# Patient Record
Sex: MALE | Race: WHITE | Employment: FULL TIME | URBAN - METROPOLITAN AREA
[De-identification: names, ages, dates, MRNs, and addresses within clinical notes are randomized per-mention and may not be internally consistent; named-entity substitution may affect disease eponyms.]

---

## 2021-09-10 ENCOUNTER — HOSPITAL ENCOUNTER (OUTPATIENT)
Dept: PREADMISSION TESTING | Age: 21
Discharge: HOME | End: 2021-09-10
Payer: COMMERCIAL

## 2021-09-10 LAB
ALBUMIN SERPL-MCNC: 4.5 G/DL (ref 3.4–5)
ALBUMIN/GLOB SERPL: 1.6 {RATIO} (ref 0.8–1.7)
ALP SERPL-CCNC: 85 U/L (ref 45–117)
ALT SERPL-CCNC: 103 U/L (ref 16–61)
ANION GAP SERPL CALC-SCNC: 7 MMOL/L (ref 3–18)
AST SERPL-CCNC: 35 U/L (ref 10–38)
BILIRUB SERPL-MCNC: 0.4 MG/DL (ref 0.2–1)
BUN SERPL-MCNC: 9 MG/DL (ref 7–18)
BUN/CREAT SERPL: 8 (ref 12–20)
CALCIUM SERPL-MCNC: 9.5 MG/DL (ref 8.5–10.1)
CHLORIDE SERPL-SCNC: 107 MMOL/L (ref 100–111)
CO2 SERPL-SCNC: 27 MMOL/L (ref 21–32)
CREAT SERPL-MCNC: 1.06 MG/DL (ref 0.6–1.3)
ERYTHROCYTE [DISTWIDTH] IN BLOOD BY AUTOMATED COUNT: 12.6 % (ref 11.6–14.5)
GLOBULIN SER CALC-MCNC: 2.8 G/DL (ref 2–4)
GLUCOSE SERPL-MCNC: 58 MG/DL (ref 74–99)
HCT VFR BLD AUTO: 43 % (ref 36–48)
HGB BLD-MCNC: 14.7 G/DL (ref 13–16)
MCH RBC QN AUTO: 29.3 PG (ref 24–34)
MCHC RBC AUTO-ENTMCNC: 34.2 G/DL (ref 31–37)
MCV RBC AUTO: 85.7 FL (ref 78–100)
PLATELET # BLD AUTO: 225 K/UL (ref 135–420)
PMV BLD AUTO: 10.3 FL (ref 9.2–11.8)
POTASSIUM SERPL-SCNC: 4.4 MMOL/L (ref 3.5–5.5)
PROT SERPL-MCNC: 7.3 G/DL (ref 6.4–8.2)
RBC # BLD AUTO: 5.02 M/UL (ref 4.35–5.65)
SODIUM SERPL-SCNC: 141 MMOL/L (ref 136–145)
WBC # BLD AUTO: 4.7 K/UL (ref 4.6–13.2)

## 2021-09-10 PROCEDURE — 36415 COLL VENOUS BLD VENIPUNCTURE: CPT

## 2021-09-10 PROCEDURE — 85027 COMPLETE CBC AUTOMATED: CPT

## 2021-09-10 PROCEDURE — 80053 COMPREHEN METABOLIC PANEL: CPT

## 2021-09-13 ENCOUNTER — HOSPITAL ENCOUNTER (OUTPATIENT)
Dept: PREADMISSION TESTING | Age: 21
Discharge: HOME | End: 2021-09-13

## 2021-09-13 VITALS — BODY MASS INDEX: 29.16 KG/M2 | WEIGHT: 220 LBS | HEIGHT: 73 IN

## 2021-09-13 RX ORDER — CHOLECALCIFEROL (VITAMIN D3) 125 MCG
CAPSULE ORAL DAILY
COMMUNITY

## 2021-09-13 RX ORDER — SODIUM CHLORIDE, SODIUM LACTATE, POTASSIUM CHLORIDE, CALCIUM CHLORIDE 600; 310; 30; 20 MG/100ML; MG/100ML; MG/100ML; MG/100ML
125 INJECTION, SOLUTION INTRAVENOUS CONTINUOUS
Status: CANCELLED | OUTPATIENT
Start: 2021-09-13

## 2021-09-13 RX ORDER — CETIRIZINE HYDROCHLORIDE 10 MG/1
CAPSULE, LIQUID FILLED ORAL DAILY
COMMUNITY

## 2021-09-13 NOTE — PERIOP NOTES
Patient educated on NPO, CHG, and current COVID 19 protocols. Patient states he is fully vaccinated. Patient states he will either fax in or bring vaccination record to [facility]. Fax number provided. Medications reviewed. Patient educated on current visitation policies. Patient advised to leave valuables at home or with a loved one. All questions answered by RN. Preoperative Nutrition Screen (REYNA)   Patient's Age: 19 y.o. Patient's BMI: Estimated body mass index is 29.03 kg/m² as calculated from the following:    Height as of this encounter: 6' 1\" (1.854 m). Weight as of this encounter: 99.8 kg (220 lb). 1. Does the patient have a documented serum albumin less than 3.0 within the last 90 days? No = 0          2. Is patient's BMI less than 18.5 (or less than 20 if age over 72)? No = 0        3. Has the patient had an unplanned weight loss of 10% of body weight or more in the last 6 months? No = 0   4. Has the patient been eating less than 50% of their normal diet in the preceding week?  No = 0   REYNA Score (number of Yes responses), 0-4 0       Electronically signed by Burton Clemens on 9/13/21 at 3:52 PM

## 2021-09-24 ENCOUNTER — HOSPITAL ENCOUNTER (OUTPATIENT)
Dept: PREADMISSION TESTING | Age: 21
Discharge: HOME | End: 2021-09-24
Payer: COMMERCIAL

## 2021-09-24 PROBLEM — S83.511A RUPTURE OF ANTERIOR CRUCIATE LIGAMENT OF RIGHT KNEE: Chronic | Status: ACTIVE | Noted: 2021-09-24

## 2021-09-24 PROBLEM — J45.909 ASTHMA: Chronic | Status: ACTIVE | Noted: 2021-09-24

## 2021-09-24 PROCEDURE — U0003 INFECTIOUS AGENT DETECTION BY NUCLEIC ACID (DNA OR RNA); SEVERE ACUTE RESPIRATORY SYNDROME CORONAVIRUS 2 (SARS-COV-2) (CORONAVIRUS DISEASE [COVID-19]), AMPLIFIED PROBE TECHNIQUE, MAKING USE OF HIGH THROUGHPUT TECHNOLOGIES AS DESCRIBED BY CMS-2020-01-R: HCPCS

## 2021-09-24 NOTE — H&P
History and Physical        Patient: Gerhardt Hidden               Sex: male          DOA: (Not on file)         YOB: 2000      Age:  19 y.o.        LOS:  LOS: 0 days        HPI:     Chrystal Lozada is in for followup of his right knee complete ACL tear/lateral meniscal tear as seen by MRI from his injury [place] [employer] two weeks ago. Dr. Tea Paulson did his left knee ACL reconstruction in 2014 and it is still doing well. The patient injured his right knee when he was down at [facility] in [place]. He was just out cleaning his bike and was just twisting and pivoting on it and felt onset of acute pain in the right knee with swelling. He has had discomfort since that time. It is mainly posteromedial, and it has not done better with relative rest. His left knee has done great since the reconstruction performed seven years ago. Standing AP, tunnel, lateral, and sunrise views of the right knee were obtained and interpreted in the office and reveal no periosteal reaction, no medullary lesions, no osteopenia, well-aligned joint spaces, and no chondrolysis. Past Medical History:   Diagnosis Date    Asthma     no inhaler in 10+ years       Past Surgical History:   Procedure Laterality Date    HX ORTHOPAEDIC Left 2015    ACL repair       No family history on file. Social History     Socioeconomic History    Marital status: SINGLE     Spouse name: Not on file    Number of children: Not on file    Years of education: Not on file    Highest education level: Not on file   Tobacco Use    Smoking status: Never Smoker    Smokeless tobacco: Never Used   Vaping Use    Vaping Use: Never used   Substance and Sexual Activity    Alcohol use:  Yes     Alcohol/week: 1.0 standard drinks     Types: 1 Cans of beer per week    Drug use: No     Social Determinants of Health     Financial Resource Strain:     Difficulty of Paying Living Expenses:    Food Insecurity:     Worried About Running Out of Food in the Last Year:     Ran Out of Food in the Last Year:    Transportation Needs:     Lack of Transportation (Medical):  Lack of Transportation (Non-Medical):    Physical Activity:     Days of Exercise per Week:     Minutes of Exercise per Session:    Stress:     Feeling of Stress :    Social Connections:     Frequency of Communication with Friends and Family:     Frequency of Social Gatherings with Friends and Family:     Attends Rastafari Services:     Active Member of Clubs or Organizations:     Attends Club or Organization Meetings:     Marital Status:        Prior to Admission medications    Medication Sig Start Date End Date Taking? Authorizing Provider   Cetirizine (ZyrTEC) 10 mg cap Take  by mouth daily. Provider, Historical   naproxen sodium (Aleve) 220 mg cap Take  by mouth daily. Provider, Historical       No Known Allergies    Review of Systems  A comprehensive review of systems was negative except for that written in the History of Present Illness. Physical Exam:      There were no vitals taken for this visit. Physical Exam:  Physical exam shows a healthy-appearing 28-year-old white male. The right knee has +1 Lachman but a good endpoint. This is identical to his left knee. He has pain on the posteromedial joint line. He has mild pain at the lateral joint line and pain with attempted terminal extension and will not extend beyond 5 degrees with his leg sort of locked. His knee has about a 10cc effusion. Assessment/Plan     Principal Problem:    Rupture of anterior cruciate ligament of right knee (9/24/2021)    Active Problems:    Asthma (9/24/2021)    Dr. Jair Colbert scheduled him for a right knee ACL reconstruction with bone-patellar tendon-bone allograft. We will plan for a lateral meniscal repair and evaluate his medial meniscus because he tore both of these at the time we did his left knee ACL reconstruction.  Dr. Jair Colbert talked about the risks, alternatives, and benefits including infection, pain and bleeding. He understands all of these and wants to proceed. Dr. Lara Salas issued him Keflex along with Roxicodone. He has crutches that he has that he can use. We will give him a knee immobilizer at the time. He understands all these risks and wants to proceed. 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 Dr. Lara Salas will see him again 3-5 days postop. We will start him in rehab then. He understands all of these risks and is willing to proceed.   Dr. Lara Salas will do this at [facility].

## 2021-09-25 LAB — SARS-COV-2, COV2NT: NOT DETECTED

## 2021-09-28 ENCOUNTER — ANESTHESIA EVENT (OUTPATIENT)
Dept: SURGERY | Age: 21
End: 2021-09-28
Payer: COMMERCIAL

## 2021-09-28 RX ORDER — SODIUM CHLORIDE 0.9 % (FLUSH) 0.9 %
5-40 SYRINGE (ML) INJECTION EVERY 8 HOURS
Status: CANCELLED | OUTPATIENT
Start: 2021-09-28

## 2021-09-28 RX ORDER — SODIUM CHLORIDE 0.9 % (FLUSH) 0.9 %
5-40 SYRINGE (ML) INJECTION AS NEEDED
Status: CANCELLED | OUTPATIENT
Start: 2021-09-28

## 2021-09-29 ENCOUNTER — HOSPITAL ENCOUNTER (OUTPATIENT)
Age: 21
Discharge: HOME | End: 2021-09-29
Attending: ORTHOPAEDIC SURGERY | Admitting: ORTHOPAEDIC SURGERY
Payer: COMMERCIAL

## 2021-09-29 ENCOUNTER — ANESTHESIA (OUTPATIENT)
Dept: SURGERY | Age: 21
End: 2021-09-29
Payer: COMMERCIAL

## 2021-09-29 VITALS
OXYGEN SATURATION: 97 % | RESPIRATION RATE: 16 BRPM | DIASTOLIC BLOOD PRESSURE: 78 MMHG | SYSTOLIC BLOOD PRESSURE: 143 MMHG | HEART RATE: 48 BPM | WEIGHT: 194.9 LBS | BODY MASS INDEX: 25.83 KG/M2 | HEIGHT: 73 IN | TEMPERATURE: 97.1 F

## 2021-09-29 DIAGNOSIS — S83.511A RUPTURE OF ANTERIOR CRUCIATE LIGAMENT OF RIGHT KNEE, INITIAL ENCOUNTER: Primary | ICD-10-CM

## 2021-09-29 PROCEDURE — 2709999900 HC NON-CHARGEABLE SUPPLY: Performed by: ORTHOPAEDIC SURGERY

## 2021-09-29 PROCEDURE — 77030002922 HC SUT FBRWRE ARTH -B: Performed by: ORTHOPAEDIC SURGERY

## 2021-09-29 PROCEDURE — 76210000021 HC REC RM PH II 0.5 TO 1 HR: Performed by: ORTHOPAEDIC SURGERY

## 2021-09-29 PROCEDURE — 76060000033 HC ANESTHESIA 1 TO 1.5 HR: Performed by: ORTHOPAEDIC SURGERY

## 2021-09-29 PROCEDURE — 76210000006 HC OR PH I REC 0.5 TO 1 HR: Performed by: ORTHOPAEDIC SURGERY

## 2021-09-29 PROCEDURE — 77030028770 HC NDL FSTFIX SYS S&N -F: Performed by: ORTHOPAEDIC SURGERY

## 2021-09-29 PROCEDURE — 77030012508 HC MSK AIRWY LMA AMBU -A: Performed by: NURSE ANESTHETIST, CERTIFIED REGISTERED

## 2021-09-29 PROCEDURE — 77030034478 HC TU IRR ARTHRO PT ARTH -B: Performed by: ORTHOPAEDIC SURGERY

## 2021-09-29 PROCEDURE — 77030028773 HC KT ACL RAP-PAC DISP S&N -C: Performed by: ORTHOPAEDIC SURGERY

## 2021-09-29 PROCEDURE — 77030002933 HC SUT MCRYL J&J -A: Performed by: ORTHOPAEDIC SURGERY

## 2021-09-29 PROCEDURE — 77030012455 HC GD PIN ORTH S&N -C: Performed by: ORTHOPAEDIC SURGERY

## 2021-09-29 PROCEDURE — 74011250637 HC RX REV CODE- 250/637: Performed by: ANESTHESIOLOGY

## 2021-09-29 PROCEDURE — 77030040361 HC SLV COMPR DVT MDII -B: Performed by: ORTHOPAEDIC SURGERY

## 2021-09-29 PROCEDURE — 77030020782 HC GWN BAIR PAWS FLX 3M -B: Performed by: ORTHOPAEDIC SURGERY

## 2021-09-29 PROCEDURE — 77030028772 HC KNOT PSHR SUT CUT CANN S&N -C: Performed by: ORTHOPAEDIC SURGERY

## 2021-09-29 PROCEDURE — 74011250636 HC RX REV CODE- 250/636: Performed by: ORTHOPAEDIC SURGERY

## 2021-09-29 PROCEDURE — 77030006788 HC BLD SAW OSC STRY -B: Performed by: ORTHOPAEDIC SURGERY

## 2021-09-29 PROCEDURE — 74011250636 HC RX REV CODE- 250/636: Performed by: PHYSICIAN ASSISTANT

## 2021-09-29 PROCEDURE — 77030022877 HC TU IRR ARTHRO PMP ARTH -B: Performed by: ORTHOPAEDIC SURGERY

## 2021-09-29 PROCEDURE — 74011250636 HC RX REV CODE- 250/636: Performed by: NURSE ANESTHETIST, CERTIFIED REGISTERED

## 2021-09-29 PROCEDURE — C1762 CONN TISS, HUMAN(INC FASCIA): HCPCS | Performed by: ORTHOPAEDIC SURGERY

## 2021-09-29 PROCEDURE — 77030006884 HC BLD SHV INCIS S&N -B: Performed by: ORTHOPAEDIC SURGERY

## 2021-09-29 PROCEDURE — 76010000149 HC OR TIME 1 TO 1.5 HR: Performed by: ORTHOPAEDIC SURGERY

## 2021-09-29 PROCEDURE — L1830 KO IMMOB CANVAS LONG PRE OTS: HCPCS | Performed by: ORTHOPAEDIC SURGERY

## 2021-09-29 PROCEDURE — 74011000250 HC RX REV CODE- 250: Performed by: ORTHOPAEDIC SURGERY

## 2021-09-29 PROCEDURE — 77030031140 HC SUT VCRL3 J&J -A: Performed by: ORTHOPAEDIC SURGERY

## 2021-09-29 PROCEDURE — 77030004451 HC BUR SHV S&N -B: Performed by: ORTHOPAEDIC SURGERY

## 2021-09-29 PROCEDURE — 74011000250 HC RX REV CODE- 250: Performed by: NURSE ANESTHETIST, CERTIFIED REGISTERED

## 2021-09-29 PROCEDURE — 77030018835 HC SOL IRR LR ICUM -A: Performed by: ORTHOPAEDIC SURGERY

## 2021-09-29 PROCEDURE — 74011250636 HC RX REV CODE- 250/636: Performed by: ANESTHESIOLOGY

## 2021-09-29 DEVICE — FAST-FIX 360 CURVED MENISCAL                                    REPAIR SYSTEM
Type: IMPLANTABLE DEVICE | Site: KNEE | Status: FUNCTIONAL
Brand: FAST-FIX

## 2021-09-29 DEVICE — 9 MM X 25 MM BIOSURE HA
Type: IMPLANTABLE DEVICE | Site: KNEE | Status: FUNCTIONAL
Brand: BIOSURE

## 2021-09-29 DEVICE — 7 MM X 20 MM BIOSURE HA
Type: IMPLANTABLE DEVICE | Site: KNEE | Status: FUNCTIONAL
Brand: BIOSURE

## 2021-09-29 DEVICE — GRAFT HUM TISS L12MM HEMIPATELLAR TEND ALLGRFT: Type: IMPLANTABLE DEVICE | Site: KNEE | Status: FUNCTIONAL

## 2021-09-29 RX ORDER — SODIUM CHLORIDE, SODIUM LACTATE, POTASSIUM CHLORIDE, CALCIUM CHLORIDE 600; 310; 30; 20 MG/100ML; MG/100ML; MG/100ML; MG/100ML
1000 INJECTION, SOLUTION INTRAVENOUS CONTINUOUS
Status: DISCONTINUED | OUTPATIENT
Start: 2021-09-29 | End: 2021-09-29 | Stop reason: HOSPADM

## 2021-09-29 RX ORDER — BUPIVACAINE HYDROCHLORIDE AND EPINEPHRINE 5; 5 MG/ML; UG/ML
INJECTION, SOLUTION EPIDURAL; INTRACAUDAL; PERINEURAL AS NEEDED
Status: DISCONTINUED | OUTPATIENT
Start: 2021-09-29 | End: 2021-09-29 | Stop reason: HOSPADM

## 2021-09-29 RX ORDER — KETAMINE HYDROCHLORIDE 10 MG/ML
INJECTION, SOLUTION INTRAMUSCULAR; INTRAVENOUS AS NEEDED
Status: DISCONTINUED | OUTPATIENT
Start: 2021-09-29 | End: 2021-09-29 | Stop reason: HOSPADM

## 2021-09-29 RX ORDER — SODIUM CHLORIDE 0.9 % (FLUSH) 0.9 %
5-40 SYRINGE (ML) INJECTION EVERY 8 HOURS
Status: DISCONTINUED | OUTPATIENT
Start: 2021-09-29 | End: 2021-09-29 | Stop reason: HOSPADM

## 2021-09-29 RX ORDER — FENTANYL CITRATE 50 UG/ML
25 INJECTION, SOLUTION INTRAMUSCULAR; INTRAVENOUS AS NEEDED
Status: DISCONTINUED | OUTPATIENT
Start: 2021-09-29 | End: 2021-09-29 | Stop reason: HOSPADM

## 2021-09-29 RX ORDER — OXYCODONE HYDROCHLORIDE 5 MG/1
5-10 TABLET ORAL
Qty: 30 TABLET | Refills: 0 | Status: SHIPPED | OUTPATIENT
Start: 2021-09-29 | End: 2021-10-06

## 2021-09-29 RX ORDER — HYDROMORPHONE HYDROCHLORIDE 1 MG/ML
0.5 INJECTION, SOLUTION INTRAMUSCULAR; INTRAVENOUS; SUBCUTANEOUS
Status: DISCONTINUED | OUTPATIENT
Start: 2021-09-29 | End: 2021-09-29 | Stop reason: HOSPADM

## 2021-09-29 RX ORDER — MAGNESIUM SULFATE 100 %
4 CRYSTALS MISCELLANEOUS AS NEEDED
Status: DISCONTINUED | OUTPATIENT
Start: 2021-09-29 | End: 2021-09-29 | Stop reason: HOSPADM

## 2021-09-29 RX ORDER — SODIUM CHLORIDE, SODIUM LACTATE, POTASSIUM CHLORIDE, CALCIUM CHLORIDE 600; 310; 30; 20 MG/100ML; MG/100ML; MG/100ML; MG/100ML
125 INJECTION, SOLUTION INTRAVENOUS CONTINUOUS
Status: DISCONTINUED | OUTPATIENT
Start: 2021-09-29 | End: 2021-09-29 | Stop reason: HOSPADM

## 2021-09-29 RX ORDER — ONDANSETRON 2 MG/ML
INJECTION INTRAMUSCULAR; INTRAVENOUS AS NEEDED
Status: DISCONTINUED | OUTPATIENT
Start: 2021-09-29 | End: 2021-09-29 | Stop reason: HOSPADM

## 2021-09-29 RX ORDER — NALOXONE HYDROCHLORIDE 4 MG/.1ML
SPRAY NASAL
Qty: 1 EACH | Refills: 0 | Status: SHIPPED | OUTPATIENT
Start: 2021-09-29

## 2021-09-29 RX ORDER — MIDAZOLAM HYDROCHLORIDE 1 MG/ML
INJECTION, SOLUTION INTRAMUSCULAR; INTRAVENOUS AS NEEDED
Status: DISCONTINUED | OUTPATIENT
Start: 2021-09-29 | End: 2021-09-29 | Stop reason: HOSPADM

## 2021-09-29 RX ORDER — HYDROMORPHONE HYDROCHLORIDE 2 MG/ML
INJECTION, SOLUTION INTRAMUSCULAR; INTRAVENOUS; SUBCUTANEOUS AS NEEDED
Status: DISCONTINUED | OUTPATIENT
Start: 2021-09-29 | End: 2021-09-29 | Stop reason: HOSPADM

## 2021-09-29 RX ORDER — CEFAZOLIN SODIUM/WATER 2 G/20 ML
2 SYRINGE (ML) INTRAVENOUS ONCE
Status: COMPLETED | OUTPATIENT
Start: 2021-09-29 | End: 2021-09-29

## 2021-09-29 RX ORDER — NALOXONE HYDROCHLORIDE 0.4 MG/ML
0.1 INJECTION, SOLUTION INTRAMUSCULAR; INTRAVENOUS; SUBCUTANEOUS AS NEEDED
Status: DISCONTINUED | OUTPATIENT
Start: 2021-09-29 | End: 2021-09-29 | Stop reason: HOSPADM

## 2021-09-29 RX ORDER — DEXTROSE 50 % IN WATER (D50W) INTRAVENOUS SYRINGE
25-50 AS NEEDED
Status: DISCONTINUED | OUTPATIENT
Start: 2021-09-29 | End: 2021-09-29 | Stop reason: HOSPADM

## 2021-09-29 RX ORDER — SODIUM CHLORIDE 0.9 % (FLUSH) 0.9 %
5-40 SYRINGE (ML) INJECTION AS NEEDED
Status: DISCONTINUED | OUTPATIENT
Start: 2021-09-29 | End: 2021-09-29 | Stop reason: HOSPADM

## 2021-09-29 RX ORDER — OXYCODONE HYDROCHLORIDE 5 MG/1
5 TABLET ORAL ONCE
Status: COMPLETED | OUTPATIENT
Start: 2021-09-29 | End: 2021-09-29

## 2021-09-29 RX ORDER — CEPHALEXIN 500 MG/1
500 CAPSULE ORAL 4 TIMES DAILY
Qty: 4 CAPSULE | Refills: 0 | Status: SHIPPED | OUTPATIENT
Start: 2021-09-29 | End: 2021-09-30

## 2021-09-29 RX ORDER — PROPOFOL 10 MG/ML
INJECTION, EMULSION INTRAVENOUS AS NEEDED
Status: DISCONTINUED | OUTPATIENT
Start: 2021-09-29 | End: 2021-09-29 | Stop reason: HOSPADM

## 2021-09-29 RX ORDER — DEXAMETHASONE SODIUM PHOSPHATE 4 MG/ML
INJECTION, SOLUTION INTRA-ARTICULAR; INTRALESIONAL; INTRAMUSCULAR; INTRAVENOUS; SOFT TISSUE AS NEEDED
Status: DISCONTINUED | OUTPATIENT
Start: 2021-09-29 | End: 2021-09-29 | Stop reason: HOSPADM

## 2021-09-29 RX ORDER — CEPHALEXIN 500 MG/1
500 CAPSULE ORAL 4 TIMES DAILY
Qty: 4 CAPSULE | Refills: 0 | Status: SHIPPED
Start: 2021-09-29 | End: 2021-09-29 | Stop reason: SDUPTHER

## 2021-09-29 RX ORDER — LIDOCAINE HYDROCHLORIDE 20 MG/ML
INJECTION, SOLUTION EPIDURAL; INFILTRATION; INTRACAUDAL; PERINEURAL AS NEEDED
Status: DISCONTINUED | OUTPATIENT
Start: 2021-09-29 | End: 2021-09-29 | Stop reason: HOSPADM

## 2021-09-29 RX ORDER — FLUMAZENIL 0.1 MG/ML
0.2 INJECTION INTRAVENOUS
Status: DISCONTINUED | OUTPATIENT
Start: 2021-09-29 | End: 2021-09-29 | Stop reason: HOSPADM

## 2021-09-29 RX ADMIN — OXYCODONE 5 MG: 5 TABLET ORAL at 10:01

## 2021-09-29 RX ADMIN — LIDOCAINE HYDROCHLORIDE 100 MG: 20 INJECTION, SOLUTION INTRAVENOUS at 08:00

## 2021-09-29 RX ADMIN — FENTANYL CITRATE 25 MCG: 50 INJECTION INTRAMUSCULAR; INTRAVENOUS at 09:28

## 2021-09-29 RX ADMIN — HYDROMORPHONE HYDROCHLORIDE 1 MG: 2 INJECTION, SOLUTION INTRAMUSCULAR; INTRAVENOUS; SUBCUTANEOUS at 08:07

## 2021-09-29 RX ADMIN — SODIUM CHLORIDE, SODIUM LACTATE, POTASSIUM CHLORIDE, AND CALCIUM CHLORIDE 125 ML/HR: 600; 310; 30; 20 INJECTION, SOLUTION INTRAVENOUS at 06:19

## 2021-09-29 RX ADMIN — FENTANYL CITRATE 25 MCG: 50 INJECTION INTRAMUSCULAR; INTRAVENOUS at 09:18

## 2021-09-29 RX ADMIN — KETAMINE HYDROCHLORIDE 20 MG: 10 INJECTION, SOLUTION INTRAMUSCULAR; INTRAVENOUS at 07:54

## 2021-09-29 RX ADMIN — KETAMINE HYDROCHLORIDE 30 MG: 10 INJECTION, SOLUTION INTRAMUSCULAR; INTRAVENOUS at 08:05

## 2021-09-29 RX ADMIN — CEFAZOLIN 2 G: 1 INJECTION, POWDER, FOR SOLUTION INTRAVENOUS at 08:03

## 2021-09-29 RX ADMIN — PROPOFOL 200 MG: 10 INJECTION, EMULSION INTRAVENOUS at 08:00

## 2021-09-29 RX ADMIN — MIDAZOLAM 2 MG: 1 INJECTION INTRAMUSCULAR; INTRAVENOUS at 07:54

## 2021-09-29 RX ADMIN — DEXAMETHASONE SODIUM PHOSPHATE 4 MG: 4 INJECTION, SOLUTION INTRAMUSCULAR; INTRAVENOUS at 08:09

## 2021-09-29 RX ADMIN — ONDANSETRON HYDROCHLORIDE 4 MG: 2 INJECTION INTRAMUSCULAR; INTRAVENOUS at 08:09

## 2021-09-29 NOTE — ANESTHESIA POSTPROCEDURE EVALUATION
Procedure(s):  SURGICAL ARTHROSCOPY RIGHT KNEE ANTERIOR CRUCIATE LIGAMENT RECONSTRUCTION LATERAL MENISCAL REPAIR [facility]). general    Anesthesia Post Evaluation        Comments: Post-Anesthesia Evaluation and Assessment    Cardiovascular Function/Vital Signs  /84   Pulse 69   Temp 36.2 °C (97.1 °F)   Resp 12   Ht 6' 1\" (1.854 m)   Wt 88.4 kg (194 lb 14.4 oz)   SpO2 98%   BMI 25.71 kg/m²     Patient is status post Procedure(s):  SURGICAL ARTHROSCOPY RIGHT KNEE ANTERIOR CRUCIATE LIGAMENT RECONSTRUCTION LATERAL MENISCAL REPAIR ([employer]). Nausea/Vomiting: Controlled. Postoperative hydration reviewed and adequate. Pain:  Pain Scale 1: FLACC (09/29/21 0942)  Pain Intensity 1: 0 (09/29/21 0942)   Managed. Neurological Status:   Neuro (WDL): Within Defined Limits (09/29/21 0930)   At baseline. Mental Status and Level of Consciousness: Arousable. Pulmonary Status:   O2 Device: None (Room air) (09/29/21 0930)   Adequate oxygenation and airway patent. Complications related to anesthesia: None    Post-anesthesia assessment completed. No concerns. Patient has met all discharge requirements. Signed By: Atul Pennington MD    September 29, 2021                   INITIAL Post-op Vital signs:   Vitals Value Taken Time   /84 09/29/21 0935   Temp 36.2 °C (97.1 °F) 09/29/21 0908   Pulse 55 09/29/21 0941   Resp 10 09/29/21 0941   SpO2 98 % 09/29/21 0941   Vitals shown include unvalidated device data.

## 2021-09-29 NOTE — ANESTHESIA PREPROCEDURE EVALUATION
Relevant Problems   RESPIRATORY SYSTEM   (+) Asthma       Anesthetic History   No history of anesthetic complications            Review of Systems / Medical History  Patient summary reviewed, nursing notes reviewed and pertinent labs reviewed    Pulmonary            Asthma        Neuro/Psych   Within defined limits           Cardiovascular  Within defined limits                     GI/Hepatic/Renal  Within defined limits              Endo/Other  Within defined limits      Anemia     Other Findings            Physical Exam    Airway  Mallampati: I  TM Distance: 4 - 6 cm  Neck ROM: normal range of motion   Mouth opening: Normal     Cardiovascular    Rhythm: regular  Rate: normal         Dental  No notable dental hx       Pulmonary  Breath sounds clear to auscultation               Abdominal  GI exam deferred       Other Findings            Anesthetic Plan    ASA: 1  Anesthesia type: general          Induction: Intravenous  Anesthetic plan and risks discussed with: Patient

## 2021-09-29 NOTE — DISCHARGE INSTRUCTIONS
DISCHARGE SUMMARY from Nurse    PATIENT INSTRUCTIONS:    After general anesthesia or intravenous sedation, for 24 hours or while taking prescription Narcotics:  · Limit your activities  · Do not drive and operate hazardous machinery  · Do not make important personal or business decisions  · Do  not drink alcoholic beverages  · If you have not urinated within 8 hours after discharge, please contact your surgeon on call. Report the following to your surgeon:  · Excessive pain, swelling, redness or odor of or around the surgical area  · Temperature over 100.5  · Nausea and vomiting lasting longer than 4 hours or if unable to take medications  · Any signs of decreased circulation or nerve impairment to extremity: change in color, persistent  numbness, tingling, coldness or increase pain  · Any questions    What to do at Home:  Lashanda Cardenas 3 TO 5 DAYS CALL FOR APPT    If you experience any of the following symptoms heavy bleeding, fevers,severe pain, circulation changes, please follow up with dr Ro Russell    *  Please give a list of your current medications to your Primary Care Provider. *  Please update this list whenever your medications are discontinued, doses are      changed, or new medications (including over-the-counter products) are added. *  Please carry medication information at all times in case of emergency situations. These are general instructions for a healthy lifestyle:    No smoking/ No tobacco products/ Avoid exposure to second hand smoke  Surgeon General's Warning:  Quitting smoking now greatly reduces serious risk to your health.     Obesity, smoking, and sedentary lifestyle greatly increases your risk for illness    A healthy diet, regular physical exercise & weight monitoring are important for maintaining a healthy lifestyle    You may be retaining fluid if you have a history of heart failure or if you experience any of the following symptoms:  Weight gain of 3 pounds or more overnight or 5 pounds in a week, increased swelling in our hands or feet or shortness of breath while lying flat in bed. Please call your doctor as soon as you notice any of these symptoms; do not wait until your next office visit. The discharge information has been reviewed with the patient and caregiver. The patient and caregiver verbalized understanding. Discharge medications reviewed with the patient and caregiver and appropriate educational materials and side effects teaching were provided. ___________________________________________________________________________________________________________________________________         ACL Surgery Discharge Instructions         Please take the time to review the following instructions before you leave the hospital and use them as guidelines during your recovery from surgery. If you have any questions you may contact my office at [phone]. Wound Care / Dressing Changes:     Please remove your big, bulky dressing 2 days after you are discharged from the hospital.  [place] Ahsan can put a band-aid or a piece of gauze over each incision and wear an ACE bandage as needed for comfort and swelling. It isn't necessary to apply antibiotic ointment to your incisions. Susy Hallmark / Bathing: You may only shower. You may shower if there is no drainage from your incisions. Please let water only drizzle over the knee. Don't take a tub bath, get in a swimming pool or Jacuzzi for at least 2 weeks after surgery. Do not soak your incisions under water. Weight Bearing Status / Braces:     Non-weight bearing right leg. Please wear your knee immobilizer at all times. Ice / Elevation:     Continue ice and elevation consistently for 48 hours after surgery. On the 3rd day after surgery, you should ice your knee 3 times per day, for 20 minutes at a time. After one week from surgery, you may use ice and elevation as needed for pain and swelling.      Diet: You may advance to your regular diet as tolerated. Medication:     1. You have been given a prescription for pain medication from the office. Take the medication as needed according to the directions on the prescription bottle. Possible side effects ofthe medication include dizziness, headache, nausea, vomiting, constipation and urinary retention. If you experience any of these side effects call the office so that we can assist you in relieving them. Discontinue the use of the pain medication if you develop itching, rash, shortness of breath or difficulties swallowing. If  these symptoms become severe or aren't relieved by discontinuing the medication  you should seek immediate medical attention. Refills of pain medication are authorized during office hours only. (8am - 5pm         Mon. thru Fri.)     2. You may resume the medication you were taking prior to your surgery. Pain medication may change the effects of any antidepressant medication you are taking. If you have any questions about possible interactions between your regular medications and the pain medication you should consult the physician who prescribes your regular medications. 3.  Please take over the counter stool softeners while you are taking narcotic pain medication. Pain medications can cause constipation. Stool softeners, warm prune juice and increasing your water and fiber intake can help prevent constipation. Do not take laxatives. Follow Up Appointment:     Please call [phone] for a follow appointment with Dr. Stephanie Matthews 3-5 days from the time of your surgery. Physical Therapy:     You need to start physical therapy once seen in the office. Signs and Symptoms to be Aware of: If any of the following signs and symptoms occur, you should contact Dr. Stephanie Matthews office.  Please be advised ifa problem arises which you feel requires immediate medical attention or you are unable to contact Dr. Stephanie Matthews office you should seek immediate medical attention at the emergency department or other health care facility you have access to. Signs and symptoms to watch for include:     1. A sudden increase in swelling and l or redness or warm that the area your surgery was performed which isn't relieved by rest, ice and elevation. 2.   Oral temperature greater than 101.5 degrees for 12 hours or more which isn't relieved by an increase in fluid intake and taking two Tylenol every 4-6 hours. 3.   Excessive drainage from your incisions, or drainage which hasn't stopped by 72 hours after your surgery despite applying a compressive dressing, ice and elevation. 4.   Calf pain, tenderness, redness or swelling which isn't relieved with rest and elevation. 5.   Fever, chills, shortness ofbreath, chest pain, nausea, vomiting or other signs and symptoms which are of concern to you.      Patient armband removed and shredded

## 2021-09-29 NOTE — OP NOTES
ANTERIOR CRUCIATE LIGAMENT RECONSTRUCTION    Patient: Shilo Swenson MRN: [id number]  CSN: [id number]   YOB: 2000  Age: 19 y.o. Sex: male        PREOPERATIVE DIAGNOSES:  1. Right anterior cruciate ligament tear. 2. Lateral meniscus tear      POSTOPERATIVE DIAGNOSES:  1. Right anterior cruciate ligament tear. 2. Lateral meniscus tear      PROCEDURE PERFORMED:  1. Surgical arthroscopy, right knee, with arthroscopic-assisted endoscopic anterior cruciate ligament reconstruction with bone patellar tendon bone allograft. 2. Lateral meniscus repair. IMPLANTS:   Implant Name Type Inv. Item Serial No.  Lot No. LRB No. Used Action   SYS DELIVERY FST  CVD -- FASTFIX - [id number]  SYS DELIVERY FST  CVD -- FASTFIX  [employer]_WD [id number] Right 1 Implanted   SYS DELIVERY FST  CVD -- FASTFIX - [id number]  SYS DELIVERY FST  CVD -- FASTFIX  [employer]_WD [id number] Right 1 Implanted   SCREW INTRF L20MM DIA7MM ANTR KNEE PLLA HA [employer] FOR CRUC - [id number]  SCREW INTRF L20MM DIA7MM ANTR KNEE PLLA HA [employer] FOR [facility] AND NEPHEW_WD [id number] Right 1 Implanted   SCREW INTRF L25MM DIA9MM ANTR KNEE PLLA HA [employer] FOR CRUC - [id number]  SCREW INTRF L25MM DIA9MM ANTR KNEE PLLA HA [employer] FOR [facility] AND NEPHEW_WD [id number] Right 1 Implanted   GRAFT HUM TISS L12MM HEMIPATELLAR TEND ALLGRFT - [id number]  GRAFT HUM TISS L12MM HEMIPATELLAR TEND ALLGRFT [id number] [employer]_WD  Right 1 Implanted       SURGEON: Severa Mao, MD    FIRST ASSISTANT: Lisseth Roberts PA-C    ANESTHESIA: General     COMPLICATIONS: None. TOURNIQUET TIME: None. ESTIMATED BLOOD LOSS: Minimal.    FINDINGS: Same    SPECIMENS REMOVED: none      NDICATION FOR PROCEDURE: A 19 y.o. WHITE/NON- male with known complete anterior cruciate ligament tear. This has been documented by clinical exam and MRI  with continued knee instability.  He has failed conservative treatment. The patient now presents for ACL reconstruction. PROCEDURE:  The patient was brought into the operating theater. After adequate anesthesia, the surgical knee was prepped and draped in a typical sterile fashion. The bone patellar tendon bone cortes-tendon allograft was fashioned first, and was made into a bone plug. On one side, there was 9 mm x 20 mm and the other side was 10 mm x 20 mm. The two drill holes were placed in each bone plug and two #2 FiberWires were placed in each plug to allow passage later in the case. The remaining tendon was made into a cylindrical graft using a running locking 2-0 Vicryl stitch, and then placed on the GraftMaster under 15 pounds of tension. The knee was then instilled with 30 mL of 0.25% Marcaine with 1:200,000 epinephrine. Standard anterolateral and anteromedial portals were anesthetized with the same mixture. The scope was placed laterally and the probe medially. Findings at this time showed that the medial compartment had grade 3 articular surfaces with a normal medial meniscus on probing. The lateral joint line was examined in the figure-of-four position. The ACL was seen to be disrupted off of the femoral origin and the PCL was intact. The lateral compartment had normal articular surfaces with a perpheral posterior horn vertical 1.5cm lateral meniscus tear. This was debrided and 2 cerclage fastfix stitches were applied with good repair. The patellofemoral joint was examined last in terminal extension. It showed a normal trochlea and normal patella. The knee was then allowed to drape over the side of the table, and an appropriate minimal notchplasty was carried out using the INCISOR Plus blade/Satish. The ACL tear was removed and the notchplasty was then widened to the appropriate width, removing a couple of millimeters of bone and cartilage from the medial wall of the LFC.    The Tempered Mind Flexible ACL Guide System was then used.  The tibial tunnel was then drilled using the Smith and [address] set at 55 degrees to the anteromedial portal, with a 2.4 passing pin that was slightly anterior and slightly medial of the center of the tibial footprint. This was then over-drilled with a 10-mm cannulated drill bit and the intraarticular portion was rasped. The femoral tunnel was then identified after cleaning the medial wall of the lateral femoral condyle at the anatomic position, about 7-8 mm from the posterior margin of the articular surfaces, and the same from the inferior aspect. It was really right between the anteromedial and posterolateral bundles. The femoral tunnel was then drilled to the anteromedial portal with a ALEJANDRO curved femoral guide and the flexible 2.4 passing pin was pushed through the anatomic center and out the lateral side, just above the iliotibial band. The guide was removed and the 9-mm flexible drill bit was manually advanced in forward until reaching the notch protecting the articular surace. With the knee flexed greater than 90 degrees, the femoral tunnel was reamed to appropriate depth. The reamer was removed and the passing stitch was shuttled through the pin and out the lateral side. All debris was then removed from the tunnels in the joint. The shuttle stitch loop was then pulled down through the tibial tunnel. The concave rasp was used on the intraarticular portion of the tibial tunnel and the pineapple rasp was used on the femoral tunnel. The sutures of the femoral-sided bone block on the ACL graft were placed through the loop of the passing stitch. The tendon had been removed from the [place]. The tendon was then pulled up into position, keeping the bony surface facing anteriorly, to put the tendon more posteriorly.   Once this was fully seated flush, which passed easily, the 1.2-mm guidewire was advanced through the anteromedial portal and into the tunnel on the femoral side. A 7 mm x 20 mm Biosure HA interference screw was then placed over this, with excellent purchase and  fit. The knee was then cycled, holding tension on the femoral side, showing good physiometry of the graft and the other guidewire was placed parallel to the graft on the tibial side and a 9 mm x 25 mm Biosure HA interference screw was then placed for tibial bone block fixation. This was screwed till it was flush with the graft and the guidewire was removed. The patient had no pivot shift sign with a negative Lachman and no notch impingement. He had normal ACL physiometry as mentioned. The wounds were then irrigated out. The incision was closed with horizontal 4-0 Monocryl and Steri-Strips. They had 4 x 4's placed, cast padding, and Ace wrap, from the toes to the thigh. The patient was put in a knee immobilizer, returned to the recovery room in awake and stable condition. A physician assistant was used during the surgery. All instrument, sponge, and needle counts were correct. A physician assistant was used during the surgery which contributes to better patient outcomes by decreasing operating room time and decreasing the amount of anesthesia the patient had to undergo. The physician assistant helped with positioning and draping of the patient, retraction of soft tissues as necessary so as not to traumatize the tissues. During several parts of the procedure the PA used the arthroscope to help with visualization while I performed the actual surgery. The PA also used the shaver under my direction with either the Helicut kylie or the suction/shaver attachment to help complete the procedure. During the surgery the camera was held by the PA as well as alternating with holding the leg in the correct position for any meniscal work that needed to be done and allow me to  place the ACL graft  correctly.    The physician assistant instilled local anesthetic which decreased the need for post operative narcotics. The PA helped in the Preop prepping of the ACL Graft passing stitches, performing traction, tying and cutting sutures. The PA held the ACL guide instrumentation while I used the drill to place the guide wires or the drill bit. The PA retracted soft tissues as well to allow screw placement. During closure the physician assistant was able to close the portals with an inverted 3-0 Monocryl ensuring a water tight closure and decreasing the risk of deep infection. The steri strips  and the dressing were applied by the PA to ensure a tight closure. This helps contribute to a lower risk of infection.        Jose Carlos Garcia MD  9/29/2021

## 2021-09-29 NOTE — INTERVAL H&P NOTE
Update History & Physical    The Patient's History and Physical of September 24,   The surgery was reviewed with the patient and I examined the patient. There was no change. The surgical site was confirmed by the patient and me. Plan:  The risk, benefits, expected outcome, and alternative to the recommended procedure have been discussed with the patient. Patient understands and wants to proceed with the procedure.     Electronically signed by Anya Ni MD on 9/29/2021 at 7:25 AM

## (undated) DEVICE — SUT MONOCRYL PLUS UD 3-0 --

## (undated) DEVICE — IMMOBILIZER KNEE UNIV L19IN FOR 12-24IN THGH FOAM T BAR

## (undated) DEVICE — TABLE COVER: Brand: CONVERTORS

## (undated) DEVICE — SYRINGE CATH TIP 50ML

## (undated) DEVICE — 5.5 MM ABRADER STRAIGHT BURRS,                                    POWER/EP-1, BLACK, 8000 MAXIMUM RPM,                                    PACKAGED 6 PER BOX, STERILE

## (undated) DEVICE — PREP SKN CHLRAPRP APL 26ML STR --

## (undated) DEVICE — SOLUTION IRRIG 3000ML LAC R FLX CONT

## (undated) DEVICE — PAD,ABDOMINAL,5"X9",ST,LF,25/BX: Brand: MEDLINE INDUSTRIES, INC.

## (undated) DEVICE — SUT VCRL + 2-0 27IN CT2 UD -- 36/BX

## (undated) DEVICE — NEEDLE HYPO 25GA L1.5IN BLU POLYPR HUB S STL REG BVL STR

## (undated) DEVICE — UNDERCAST PADDING: Brand: DEROYAL

## (undated) DEVICE — FAST-FIX 360 STRAIGHT KNOT                                    PUSHER/CUTTER AND SLOTTED CANNULA SETS: Brand: FAST-FIX

## (undated) DEVICE — 2.4 MM FLEXIBLE PASSING PINS,                                    STERILE 5 PER PACKAGE: Brand: ENDOBUTTON

## (undated) DEVICE — MASTISOL ADHESIVE LIQ 2/3ML

## (undated) DEVICE — GARMENT,MEDLINE,DVT,INT,CALF,MED, GEN2: Brand: MEDLINE

## (undated) DEVICE — NEEDLE HYPO 18GA L1.5IN PNK POLYPR HUB S STL THN WALL FILL

## (undated) DEVICE — SPLINT KNEE UNIV FOR LESS THAN 36IN L20IN FOAM LAM E CNTCT

## (undated) DEVICE — TUBING PMP L8FT LNG W/ CONN FOR AR-6400 REDEUCE

## (undated) DEVICE — PADDING CAST SOF-ROL 6INX4YD --

## (undated) DEVICE — 4.5 MM INCISOR PLUS STRAIGHT                                    BLADES, POWER/EP-1, VIOLET, PACKAGED                                    6 PER BOX, STERILE

## (undated) DEVICE — SUTURE FIBERWIRE SZ 2 W/ TAPERED NEEDLE BLUE L38IN NONABSORB BLU L26.5MM 1/2 CIRCLE AR7200

## (undated) DEVICE — SOLUTION IV STRL H2O 500 ML AQUALITE POUR BTL

## (undated) DEVICE — PRECISION (9.0 X 0.51 X 25.0MM)

## (undated) DEVICE — MAT SUCTION FLR 44X36 IN DBL SIDE BLU

## (undated) DEVICE — INTENDED FOR TISSUE SEPARATION, AND OTHER PROCEDURES THAT REQUIRE A SHARP SURGICAL BLADE TO PUNCTURE OR CUT.: Brand: BARD-PARKER ® CARBON RIB-BACK BLADES

## (undated) DEVICE — GLOVE SURG SZ 75 L12IN FNGR THK79MIL GRN LTX FREE

## (undated) DEVICE — STRIP,CLOSURE,WOUND,MEDI-STRIP,1/2X4: Brand: MEDLINE

## (undated) DEVICE — 1.2 RAP-PAC B LATEX FREE: Brand: RAP-PAC

## (undated) DEVICE — TUBE IRRIG L8IN LNG PT W/ CONN FOR PMP SYS REDEUCE

## (undated) DEVICE — PACK PROCEDURE SURG KNEE ARTHSCP CUST

## (undated) DEVICE — GLOVE ORANGE PI 7 1/2   MSG9075